# Patient Record
Sex: MALE | URBAN - METROPOLITAN AREA
[De-identification: names, ages, dates, MRNs, and addresses within clinical notes are randomized per-mention and may not be internally consistent; named-entity substitution may affect disease eponyms.]

---

## 2023-10-29 ENCOUNTER — HOSPITAL ENCOUNTER (EMERGENCY)
Facility: HOSPITAL | Age: 28
Discharge: HOME | End: 2023-10-29
Attending: EMERGENCY MEDICINE

## 2023-10-29 VITALS
TEMPERATURE: 96.5 F | HEART RATE: 81 BPM | OXYGEN SATURATION: 95 % | RESPIRATION RATE: 16 BRPM | SYSTOLIC BLOOD PRESSURE: 100 MMHG | DIASTOLIC BLOOD PRESSURE: 61 MMHG

## 2023-10-29 DIAGNOSIS — F10.920 ALCOHOLIC INTOXICATION WITHOUT COMPLICATION (CMS-HCC): Primary | ICD-10-CM

## 2023-10-29 LAB — GLUCOSE BLD MANUAL STRIP-MCNC: 124 MG/DL (ref 74–99)

## 2023-10-29 PROCEDURE — 82947 ASSAY GLUCOSE BLOOD QUANT: CPT

## 2023-10-29 PROCEDURE — 99283 EMERGENCY DEPT VISIT LOW MDM: CPT | Performed by: EMERGENCY MEDICINE

## 2023-10-29 ASSESSMENT — PAIN - FUNCTIONAL ASSESSMENT: PAIN_FUNCTIONAL_ASSESSMENT: 0-10

## 2023-10-29 ASSESSMENT — PAIN SCALES - GENERAL: PAINLEVEL_OUTOF10: 0 - NO PAIN

## 2023-10-29 NOTE — ED PROVIDER NOTES
"CC: Alcohol Intoxication (Pt found after party \"passed out\" drunk. Responds to painful stimuli.  en route. Vital signs stable. )     HPI:   Patient is a 27-year-old male who is brought in due to severe intoxication.  Patient was found at a party passed out drunk and responds only to painful stimuli.  Patient's blood glucose on route was 140.  Patient was hemodynamically stable protecting his airway.  Other history limited due to patient is nonverbal at this time.    Limitations to History: intoxicated  Additional History Obtained from: EMS    PMHx/PSHx:  Per HPI.   - has no past medical history on file.  - has no past surgical history on file.    Social History:  - Tobacco:  has no history on file for tobacco use.   - Alcohol:  has no history on file for alcohol use.   - Drugs:  has no history on file for drug use.     Medications: Reviewed in EMR.     Allergies:  Patient has no known allergies.    ???????????????????????????????????????????????????????????????  Triage Vitals:  T 35.8 °C (96.5 °F)  HR 79  /84  RR 16  O2 98 % None (Room air)    Physical Exam  Constitutional:       Appearance: Normal appearance.      Comments: Asleep and intoxicated   HENT:      Head: Normocephalic and atraumatic.      Nose: No congestion or rhinorrhea.      Mouth/Throat:      Pharynx: Oropharynx is clear.   Eyes:      General: No scleral icterus.     Pupils: Pupils are equal, round, and reactive to light.   Neck:      Vascular: No carotid bruit.   Cardiovascular:      Rate and Rhythm: Normal rate and regular rhythm.      Pulses: Normal pulses.      Heart sounds: Normal heart sounds. No murmur heard.     No friction rub. No gallop.   Pulmonary:      Effort: Pulmonary effort is normal. No respiratory distress.      Breath sounds: Normal breath sounds. No wheezing, rhonchi or rales.   Abdominal:      General: Abdomen is flat. There is no distension.      Palpations: Abdomen is soft.      Tenderness: There is no " abdominal tenderness. There is no guarding.   Musculoskeletal:         General: No swelling or tenderness. Normal range of motion.      Cervical back: No rigidity.   Skin:     General: Skin is warm and dry.      Capillary Refill: Capillary refill takes less than 2 seconds.   Neurological:      Comments: GCS 4; withdraws to sternal rubbing           ED Course/Medical Decision Making:  Patient is a 27-year-old male who is brought in due to severe intoxication.  Patient does not have any significant medical history per chart review.  Patient was withdrawing to noxious stimuli.  He was protecting his airway and sobered on his own.  Patient sober reevaluation was unremarkable without any focal deficits or significant pain.  Patient was comfortable being discharged in hemodynamically stable condition.  He was given strict return precautions that he verbalized understanding to.    External records reviewed: recent inpatient, clinic, and prior ED notes  Diagnostic imaging independently reviewed/interpreted by me (as reflected in MDM) includes: none  Social Determinants Affecting Care:   Discussion of management with other providers: attendinf  Prescription Drug Consideration: none  Escalation of Care: none    Impression:   Intoxication    Disposition: Discharge      Procedures ? Smart[employer] last updated 10/29/2023 6:10 AM        Torie Heard MD  Resident  10/29/23 0612